# Patient Record
Sex: FEMALE | Race: WHITE | Employment: UNEMPLOYED | URBAN - METROPOLITAN AREA
[De-identification: names, ages, dates, MRNs, and addresses within clinical notes are randomized per-mention and may not be internally consistent; named-entity substitution may affect disease eponyms.]

---

## 2017-05-04 PROBLEM — K58.0 IRRITABLE BOWEL SYNDROME WITH DIARRHEA: Status: ACTIVE | Noted: 2017-05-04

## 2018-06-14 PROCEDURE — 87493 C DIFF AMPLIFIED PROBE: CPT | Performed by: INTERNAL MEDICINE

## 2018-06-14 PROCEDURE — 87272 CRYPTOSPORIDIUM AG IF: CPT | Performed by: INTERNAL MEDICINE

## 2018-06-14 PROCEDURE — 87177 OVA AND PARASITES SMEARS: CPT | Performed by: INTERNAL MEDICINE

## 2018-06-14 PROCEDURE — 87329 GIARDIA AG IA: CPT | Performed by: INTERNAL MEDICINE

## 2018-06-14 PROCEDURE — 87209 SMEAR COMPLEX STAIN: CPT | Performed by: INTERNAL MEDICINE

## 2018-06-14 PROCEDURE — 83993 ASSAY FOR CALPROTECTIN FECAL: CPT | Performed by: INTERNAL MEDICINE

## 2018-11-08 NOTE — PROGRESS NOTES
Outpatient Preconceptual Maternal-Fetal Medicine Consultation    Dear Dr. Rahel Armas    Thank you for requesting a preconceptual maternal-fetal medicine consultation your patient Desi Brown.   As you are aware she is a 26year old female who is not curr General: alert and oriented,no acute distress  Abdomen: gravid, soft, non-tender  Extremities: non-tender, no edema    DISCUSSION  During her visit we discussed and reviewed the following issues:  PRIOR PLACENTAL ABRUPTION  History:  The patient had a co fibroids may increase the risk. As many as 10 percent of women using cocaine in the third trimester will suffer placental abruption.  The pathophysiological effect of cocaine in the raphael of abruption is unknown, but may be related to cocaine-induced a or use cocaine should be encouraged to stop. Poorly controlled hypertension should be controlled. These changes have proven health benefits even in the absence of pregnancy.  On the other hand, placental abruption resulting from trauma is not likely to recu Aneuploidy    We also discussed the increased risk of chromosomal abnormalities associated with advanced maternal age. I reviewed that an ultrasound examination cannot reliably exclude potential genetic abnormalities.  Given that the patient will be at [place] [id number] Invasive genetic testing in the first trimester  · Limited U/S & cervical length assessment at 16 weeks  · Level II at 20 weeks  · Monthly growth U/S after level II (24, 28, 32, 36)  · Weekly NST's at 32 weeks  · Due to prior vertical uterine incision, lupillo

## 2018-11-09 ENCOUNTER — OFFICE VISIT (OUTPATIENT)
Dept: PERINATAL CARE | Facility: HOSPITAL | Age: 33
End: 2018-11-09
Attending: OBSTETRICS & GYNECOLOGY
Payer: COMMERCIAL

## 2018-11-09 VITALS
BODY MASS INDEX: 19.66 KG/M2 | DIASTOLIC BLOOD PRESSURE: 65 MMHG | HEIGHT: 65 IN | HEART RATE: 89 BPM | WEIGHT: 118 LBS | SYSTOLIC BLOOD PRESSURE: 102 MMHG

## 2018-11-09 DIAGNOSIS — Z31.69 PRE-CONCEPTION COUNSELING: ICD-10-CM

## 2018-11-09 PROCEDURE — 99245 OFF/OP CONSLTJ NEW/EST HI 55: CPT | Performed by: OBSTETRICS & GYNECOLOGY
